# Patient Record
Sex: MALE | Race: WHITE | NOT HISPANIC OR LATINO | Employment: FULL TIME | URBAN - NONMETROPOLITAN AREA
[De-identification: names, ages, dates, MRNs, and addresses within clinical notes are randomized per-mention and may not be internally consistent; named-entity substitution may affect disease eponyms.]

---

## 2017-05-10 ENCOUNTER — OFFICE VISIT - GICH (OUTPATIENT)
Dept: FAMILY MEDICINE | Facility: OTHER | Age: 37
End: 2017-05-10

## 2017-05-10 ENCOUNTER — HISTORY (OUTPATIENT)
Dept: FAMILY MEDICINE | Facility: OTHER | Age: 37
End: 2017-05-10

## 2017-05-10 DIAGNOSIS — J40 BRONCHITIS: ICD-10-CM

## 2017-11-07 ENCOUNTER — HISTORY (OUTPATIENT)
Dept: FAMILY MEDICINE | Facility: OTHER | Age: 37
End: 2017-11-07

## 2017-11-07 ENCOUNTER — OFFICE VISIT - GICH (OUTPATIENT)
Dept: FAMILY MEDICINE | Facility: OTHER | Age: 37
End: 2017-11-07

## 2017-11-07 DIAGNOSIS — H92.01 OTALGIA OF RIGHT EAR: ICD-10-CM

## 2017-11-07 DIAGNOSIS — H69.81 OTHER SPECIFIED DISORDERS OF EUSTACHIAN TUBE, RIGHT EAR: ICD-10-CM

## 2017-12-17 ENCOUNTER — OFFICE VISIT - GICH (OUTPATIENT)
Dept: FAMILY MEDICINE | Facility: OTHER | Age: 37
End: 2017-12-17

## 2017-12-17 ENCOUNTER — HISTORY (OUTPATIENT)
Dept: FAMILY MEDICINE | Facility: OTHER | Age: 37
End: 2017-12-17

## 2017-12-17 DIAGNOSIS — R05.9 COUGH: ICD-10-CM

## 2017-12-27 NOTE — PROGRESS NOTES
Patient Information     Patient Name MRN Sex Alexis Enriquez [id number] Male 1980      Progress Notes by Tosha Preciado NP at 2017  2:45 PM     Author:  Tosha Precaido NP Service:  (none) Author Type:  PHYS- Nurse Practitioner     Filed:  2017  3:10 PM Encounter Date:  2017 Status:  Signed     :  Tosha Preciado NP (PHYS- Nurse Practitioner)            HPI:    Alexis Ibarra is a 37 y.o. male who presents to clinic today for ear concerns. Having sharp pain in right ear for 3-4 days. Denies any cold sx. No fevers. He has taken ibuprofen for ear pain. Denies sore throat or tooth concerns. Pain better with chewing gum. Hx of OM, been a long time since last time.     Past Medical History:     Diagnosis  Date     Asthma      Past Surgical History:      Procedure  Laterality Date     SHOULDER ARTHROSCOPY      Left shoulder arthroscopy and Bank heart repair       Social History     Substance Use Topics       Smoking status: Never Smoker     Smokeless tobacco: Never Used     Alcohol use No     Current Outpatient Prescriptions       Medication  Sig Dispense Refill     albuterol HFA 90 mcg/actuation inhaler Inhale 1-2 Puffs by mouth every 6 hours if needed. 1 Inhaler 0     No current facility-administered medications for this visit.      Medications have been reviewed by me and are current to the best of my knowledge and ability.    Allergies     Allergen  Reactions     Amoxicillin Hives       ROS:  Pertinent positives and negatives are noted in HPI.    EXAM:  General appearance: well appearing male, in no acute distress  Head: normocephalic, atraumatic  Ears: TM's with distorted cone of light and somewhat retracted, no erythema, canals clear bilaterally  Eyes: conjunctivae normal  Orophayrnx: moist mucous membranes, tonsils without erythema, exudates or petechiae, no post nasal drip seen  Neck: supple without adenopathy  Respiratory: clear to auscultation bilaterally  Cardiac: RRR with  no murmurs  Psychological: normal affect, alert and pleasant    ASSESSMENT/PLAN:    ICD-10-CM    1. Otalgia, right ear H92.01    2. Dysfunction of right eustachian tube H69.81    No ear infection noted. Suspect eustachian tube dysfunction. Discussed sx management and s/s that would warrant f/u. All questions were answered and he is in agreement with plan.     Patient Instructions   Warm packs to ear as needed  Tylenol or ibuprofen for ear pain  Chewing gum  May try allergy medication such as Benadryl or Sudafed   Follow up as needed

## 2017-12-28 NOTE — PATIENT INSTRUCTIONS
Patient Information     Patient Name MRAlexis Kiran [id number] Male 1980      Patient Instructions by Tosha Preciado NP at 2017  2:45 PM     Author:  Tosha Preciado NP Service:  (none) Author Type:  PHYS- Nurse Practitioner     Filed:  2017  3:05 PM Encounter Date:  2017 Status:  Signed     :  Tosha Preciado NP (PHYS- Nurse Practitioner)            Warm packs to ear as needed  Tylenol or ibuprofen for ear pain  Chewing gum  May try allergy medication such as Benadryl or Sudafed   Follow up as needed

## 2017-12-30 NOTE — NURSING NOTE
Patient Information     Patient Name MRN Alexis Leigh [id number] Male 1980      Nursing Note by Mable Dubose at 2017  2:45 PM     Author:  Mable Dubose Service:  (none) Author Type:  (none)     Filed:  2017  3:01 PM Encounter Date:  2017 Status:  Signed     :  Mable Dubose            Patient presents to the clinic for right ear pain x3-4 days. Patient reports having sharp pains in his ear and states he has no other symptoms. He has taken ibuprofen with relief.  Mable JAIN, ZOE.......2017..2:56 PM

## 2018-01-05 NOTE — PROGRESS NOTES
"Patient Information     Patient Name MRN Sex Alexis Enriquez [id number] Male 1980      Progress Notes by Estrella Gillette NP at 5/10/2017 12:30 PM     Author:  Estrella Gillette NP Service:  (none) Author Type:  PHYS- Nurse Practitioner     Filed:  5/10/2017  4:13 PM Encounter Date:  5/10/2017 Status:  Signed     :  Estrella Gillette NP (PHYS- Nurse Practitioner)            Nursing Notes:   Kellee Ibarra  5/10/2017 12:58 PM  Signed  Patient presents with fever, chest congestion, body ache, cough productive dark green, vomiting for 3 weeks. Kellee Ibarra LPN .............5/10/2017  12:38 PM    HPI:    Alexis Ibarra is a 36 y.o. male who presents to clinic today for congestion, cough, ear pain, SOB, wheezing at night and joint pain. Symptoms started three weeks ago and have worsened over last week. Denies sore throat, vomiting or sinus pain. Treating symptoms with occasional Mucinex and Tylenol. No flu shot . History of asthma, nonsmoker.     Past Medical History:     Diagnosis  Date     Asthma      Past Surgical History:      Procedure  Laterality Date     SHOULDER ARTHROSCOPY      Left shoulder arthroscopy and Bank heart repair       Social History     Substance Use Topics       Smoking status: Never Smoker     Smokeless tobacco: Never Used     Alcohol use No     No current outpatient prescriptions on file.     No current facility-administered medications for this visit.      Medications have been reviewed by me and are current to the best of my knowledge and ability.    Allergies     Allergen  Reactions     Amoxicillin Hives       ROS:  Refer to HPI    /72  Pulse 81  Temp 99  F (37.2  C) (Tympanic)   Ht 1.71 m (5' 7.32\")  Wt 87.2 kg (192 lb 3.2 oz)  SpO2 97%  BMI 29.81 kg/m2    EXAM:  General Appearance: Somewhat ill appearing male, appropriate appearance for age. No acute distress  Ears: Left TM with bony landmarks appreciated " with cone of light, no erythema, no effusion, no bulging, no purulence.  Right TM with bony landmarks appreciated with cone of light, no erythema, no effusion, no bulging, no purulence.   Left auditory canal clear, Right auditory canal clear, normal external ears, non tender.  Orophayrnx: moist mucous membranes, posterior pharynx, tonsils without hypertrophy, no erythema, no exudates or petechiae, no post nasal drip seen.  No sinus pain upon palpation of the frontal, maxillary, or ethmoid sinuses  Neck: supple without adenopathy  Respiratory: normal chest wall and respirations.  Normal effort.  Clear to auscultation bilaterally, no wheezes or rhonchi or congestion, oxygen saturation-97%  Cardiac: RRR   Psychological: normal affect, alert and pleasant    ASSESSMENT/PLAN:    ICD-10-CM    1. Bronchitis J40 azithromycin (ZITHROMAX) 250 mg tablet      albuterol HFA 90 mcg/actuation inhaler   Cough, congestion, fevers and body aches  On exam: somewhat ill appearing male with low grade fever, lungs clear to auscultation, TMs without erythema, tonsils without erythema  Diagnosis: Bronchitis  Treat with Zithromax 500 mgs today, then 250 mgs days 2-5  Albuterol inhaler every 4-6 hours prn wheezing  Mucinex DM as directed  Encouraged fluids  Tylenol or ibuprofen PRN  Follow up if symptoms persist or worsen    Patient Instructions      Index [place]uran   Acute Bronchitis: Brief Version   What is acute bronchitis?  When you have acute bronchitis, the airways between your windpipe and your lungs are swollen and irritated. It is also called a chest cold.  What is the cause?  Acute bronchitis is most often caused by a virus, like a cold or the flu. Less often, it can be caused by bacteria.  Most of the time, it clears up in several days, but the cough can take longer to go away. It may take you longer to get better if:    You smoke cigarettes.    You have a heart or lung disease or other health problems.    There s a lot of air  pollution or secondhand smoke where you live or work.  What are the symptoms?  You may:    Have a deep cough with yellowish or greenish mucus.    Feel pain in your chest when you breathe deeply or cough.    Wheeze or feel short of breath.    Have a fever or chills.  How can I take care of myself?  Rest at home and drink plenty of fluids to keep the mucus loose. Don t smoke, and stay away from others who are smoking. You should get better in a several days.  If your symptoms are severe or you have heart or lung disease or diabetes, you may need to see your healthcare provider or take medicine.  How can I help prevent acute bronchitis?  You can lower your chances of getting bronchitis if you wash your hands after using the restroom, coughing, sneezing, or blowing your nose. Also wash your hands before eating or touching your eyes.  Developed by [employer].  Adult Advisor 2016.3 published by [employer].  Last modified: 2016-06-29  Last reviewed: 2016-06-08  This content is reviewed periodically and is subject to change as new health information becomes available. The information is intended to inform and educate and is not a replacement for medical evaluation, advice, diagnosis or treatment by a healthcare professional.  References   Adult Advisor 2016.3 Index    Copyright   2016 [employer], a division of [employer] All rights reserved.

## 2018-01-05 NOTE — NURSING NOTE
Patient Information     Patient Name MRN Sex Alexis Enriquez [id number] Male 1980      Nursing Note by Kellee Ibarra at 5/10/2017 12:30 PM     Author:  Kellee Ibarra Service:  (none) Author Type:  NURS- Student Practical Nurse     Filed:  5/10/2017 12:58 PM Encounter Date:  5/10/2017 Status:  Signed     :  Kellee Ibarra (NURS- Student Practical Nurse)            Patient presents with fever, chest congestion, body ache, cough productive dark green, vomiting for 3 weeks. Kellee Ibarra LPN .............5/10/2017  12:38 PM

## 2018-01-05 NOTE — PATIENT INSTRUCTIONS
Patient Information     Patient Name MRN Alexis Leigh [id number] Male 1980      Patient Instructions by Estrella Gillette NP at 5/10/2017 12:30 PM     Author:  Estrella Gillette NP Service:  (none) Author Type:  PHYS- Nurse Practitioner     Filed:  5/10/2017  1:11 PM Encounter Date:  5/10/2017 Status:  Signed     :  Estrella Gillette NP (PHYS- Nurse Practitioner)               Index [place]ani   Acute Bronchitis: Brief Version   What is acute bronchitis?  When you have acute bronchitis, the airways between your windpipe and your lungs are swollen and irritated. It is also called a chest cold.  What is the cause?  Acute bronchitis is most often caused by a virus, like a cold or the flu. Less often, it can be caused by bacteria.  Most of the time, it clears up in several days, but the cough can take longer to go away. It may take you longer to get better if:    You smoke cigarettes.    You have a heart or lung disease or other health problems.    There s a lot of air pollution or secondhand smoke where you live or work.  What are the symptoms?  You may:    Have a deep cough with yellowish or greenish mucus.    Feel pain in your chest when you breathe deeply or cough.    Wheeze or feel short of breath.    Have a fever or chills.  How can I take care of myself?  Rest at home and drink plenty of fluids to keep the mucus loose. Don t smoke, and stay away from others who are smoking. You should get better in a several days.  If your symptoms are severe or you have heart or lung disease or diabetes, you may need to see your healthcare provider or take medicine.  How can I help prevent acute bronchitis?  You can lower your chances of getting bronchitis if you wash your hands after using the restroom, coughing, sneezing, or blowing your nose. Also wash your hands before eating or touching your eyes.  Developed by [facility].  Adult Advisor 2016.3 published by  [employer].  Last modified: 2016-06-29  Last reviewed: 2016-06-08  This content is reviewed periodically and is subject to change as new health information becomes available. The information is intended to inform and educate and is not a replacement for medical evaluation, advice, diagnosis or treatment by a healthcare professional.  References   Adult Advisor 2016.3 Index    Copyright   2016 [employer], a division of [employer] All rights reserved.

## 2018-01-27 VITALS
BODY MASS INDEX: 31.16 KG/M2 | WEIGHT: 198.5 LBS | HEIGHT: 67 IN | SYSTOLIC BLOOD PRESSURE: 128 MMHG | DIASTOLIC BLOOD PRESSURE: 72 MMHG | HEART RATE: 76 BPM | TEMPERATURE: 97.2 F

## 2018-01-27 VITALS
SYSTOLIC BLOOD PRESSURE: 138 MMHG | HEART RATE: 81 BPM | WEIGHT: 192.2 LBS | TEMPERATURE: 99 F | BODY MASS INDEX: 30.17 KG/M2 | HEIGHT: 67 IN | DIASTOLIC BLOOD PRESSURE: 72 MMHG | OXYGEN SATURATION: 97 %

## 2018-01-31 ENCOUNTER — DOCUMENTATION ONLY (OUTPATIENT)
Dept: FAMILY MEDICINE | Facility: OTHER | Age: 38
End: 2018-01-31

## 2018-01-31 PROBLEM — J45.909 ASTHMA: Status: ACTIVE | Noted: 2018-01-31

## 2018-01-31 PROBLEM — S43.006A CLOSED DISLOCATION OF SHOULDER: Status: ACTIVE | Noted: 2018-01-31

## 2018-01-31 RX ORDER — CODEINE PHOSPHATE/GUAIFENESIN 10-100MG/5
10 LIQUID (ML) ORAL
COMMUNITY
Start: 2017-12-17 | End: 2019-11-02

## 2018-01-31 RX ORDER — BENZONATATE 100 MG/1
1 CAPSULE ORAL 3 TIMES DAILY PRN
COMMUNITY
Start: 2017-12-17 | End: 2019-11-02

## 2018-01-31 RX ORDER — ALBUTEROL SULFATE 90 UG/1
1-2 AEROSOL, METERED RESPIRATORY (INHALATION) EVERY 6 HOURS PRN
COMMUNITY
Start: 2017-05-10 | End: 2019-11-02

## 2018-02-09 VITALS
WEIGHT: 193 LBS | TEMPERATURE: 98.3 F | DIASTOLIC BLOOD PRESSURE: 80 MMHG | SYSTOLIC BLOOD PRESSURE: 138 MMHG | HEART RATE: 72 BPM | BODY MASS INDEX: 30.23 KG/M2

## 2018-02-12 NOTE — PROGRESS NOTES
Patient Information     Patient Name MRN Sex Alexis Enriquez [id number] Male 1980      Progress Notes by Estrella Gillette NP at 2017 12:30 PM     Author:  Estrella Gillette NP Service:  (none) Author Type:  PHYS- Nurse Practitioner     Filed:  2017  3:48 PM Encounter Date:  2017 Status:  Signed     :  Estrella Gillette NP (PHYS- Nurse Practitioner)            Nursing Notes:   Adrián Preciado  2017 12:14 PM  Signed  Patient presents to the [facility] with concerns of a sore throat, cough and right ear pain. Patient states that this has all been going for the last 3-4 days. Patient does state that his cough has been bothering him for over a week. Patient denies any use of medication today.    Adrián Preciado ....................  2017   12:10 PM      Alexis Ibarra is a 37 y.o. male who presents to clinic today for pain in right ear, chest feels heavy and pain behind eyes and really bad cough that started 3-4 days ago. Denies fever, congestion or diarrhea. Treating symptoms with Mucinex which hasn't been helpful. Eating and drinking without difficulty.     Past Medical History:     Diagnosis  Date     Asthma      Past Surgical History:      Procedure  Laterality Date     SHOULDER ARTHROSCOPY      Left shoulder arthroscopy and Bank heart repair       Social History     Substance Use Topics       Smoking status: Never Smoker     Smokeless tobacco: Never Used     Alcohol use No     Current Outpatient Prescriptions       Medication  Sig Dispense Refill     albuterol HFA 90 mcg/actuation inhaler Inhale 1-2 Puffs by mouth every 6 hours if needed. 1 Inhaler 0     No current facility-administered medications for this visit.      Medications have been reviewed by me and are current to the best of my knowledge and ability.    Allergies     Allergen  Reactions     Amoxicillin Hives       ROS:  Refer to HPI    /80 (Cuff Site: Right Arm,  Position: Sitting, Cuff Size: Adult Regular)  Pulse 72  Temp 98.3  F (36.8  C) (Tympanic)   Wt 87.5 kg (193 lb)  BMI 30.23 kg/m2    EXAM:  General Appearance: Well appearing male, appropriate appearance for age. No acute distress  Ears: Left TM with bony landmarks appreciated with cone of light, no erythema, no effusion, no bulging, no purulence.  Right TM with bony landmarks appreciated with cone of light, no erythema, no effusion, no bulging, no purulence.   Left auditory canal clear, Right auditory canal clear, normal external ears, non tender.  Orophayrnx: moist mucous membranes, posterior pharynx, tonsils without hypertrophy, no erythema  Neck: supple without adenopathy  Respiratory: normal chest wall and respirations.  Normal effort.  Clear to auscultation bilaterally  Cardiac: RRR   Psychological: normal affect, alert and pleasant    ASSESSMENT/PLAN:    ICD-10-CM    1. Cough R05 benzonatate (TESSALON) 100 mg capsule      codeine-guaiFENesin (ROBITUSSIN AC)  mg/5 mL liquid   Cough, right ear pain, chest feels heavy  On exam: well appearing male without fever, lungs clear to auscultation, TMs without erythema, tonsils without erythema  Diagnosis: Cough  Treat with increased fluids and rest  Tessalon 100 mgs PO TID prn during the daytime and Cheratussin cough syrup 10 mls prn at HS  Follow up if symptoms persist or worsen    There are no Patient Instructions on file for this visit.

## 2018-02-12 NOTE — NURSING NOTE
Patient Information     Patient Name MRN Alexis Leigh [id number] Male 1980      Nursing Note by Adrián Preciado at 2017 12:30 PM     Author:  Adrián Preciado Service:  (none) Author Type:  (none)     Filed:  2017 12:14 PM Encounter Date:  2017 Status:  Signed     :  Adrián Preciado            Patient presents to the [facility] with concerns of a sore throat, cough and right ear pain. Patient states that this has all been going for the last 3-4 days. Patient does state that his cough has been bothering him for over a week. Patient denies any use of medication today.    Adrián Preciado ....................  2017   12:10 PM

## 2018-07-23 NOTE — PROGRESS NOTES
Patient Information     Patient Name  Alexis Ibarra MRN  [id number] Sex  Male   1980      Letter by Estrella Gillette NP at      Author:  Estrella Gillette NP Service:  (none) Author Type:  (none)    Filed:   Encounter Date:  5/10/2017 Status:  (Other)             Work/School Excuse and Restrictions       Arrived:               Discharged:                                                              1:12 PM  5/10/2017        Alexis Ibarra  was seen today in the [facility], please excuse from work today May 10 and May 11.      Sincerely-    Sarah MELTON

## 2019-11-02 ENCOUNTER — OFFICE VISIT (OUTPATIENT)
Dept: FAMILY MEDICINE | Facility: OTHER | Age: 39
End: 2019-11-02
Attending: NURSE PRACTITIONER
Payer: COMMERCIAL

## 2019-11-02 VITALS
HEIGHT: 67 IN | TEMPERATURE: 98.9 F | SYSTOLIC BLOOD PRESSURE: 122 MMHG | WEIGHT: 207.9 LBS | HEART RATE: 84 BPM | RESPIRATION RATE: 20 BRPM | BODY MASS INDEX: 32.63 KG/M2 | OXYGEN SATURATION: 97 % | DIASTOLIC BLOOD PRESSURE: 82 MMHG

## 2019-11-02 DIAGNOSIS — J06.9 URI WITH COUGH AND CONGESTION: Primary | ICD-10-CM

## 2019-11-02 DIAGNOSIS — J45.909 UNCOMPLICATED ASTHMA, UNSPECIFIED ASTHMA SEVERITY, UNSPECIFIED WHETHER PERSISTENT: ICD-10-CM

## 2019-11-02 PROCEDURE — G0463 HOSPITAL OUTPT CLINIC VISIT: HCPCS

## 2019-11-02 PROCEDURE — 99213 OFFICE O/P EST LOW 20 MIN: CPT | Performed by: FAMILY MEDICINE

## 2019-11-02 RX ORDER — PSEUDOEPHEDRINE HCL 120 MG/1
120 TABLET, FILM COATED, EXTENDED RELEASE ORAL EVERY 12 HOURS
Qty: 20 TABLET | Refills: 0 | Status: SHIPPED | OUTPATIENT
Start: 2019-11-02 | End: 2019-12-12

## 2019-11-02 RX ORDER — ALBUTEROL SULFATE 90 UG/1
1-2 AEROSOL, METERED RESPIRATORY (INHALATION) EVERY 4 HOURS PRN
Qty: 1 INHALER | Refills: 5 | Status: SHIPPED | OUTPATIENT
Start: 2019-11-02

## 2019-11-02 RX ORDER — BENZONATATE 200 MG/1
200 CAPSULE ORAL 3 TIMES DAILY PRN
Qty: 30 CAPSULE | Refills: 0 | Status: SHIPPED | OUTPATIENT
Start: 2019-11-02 | End: 2019-12-12

## 2019-11-02 ASSESSMENT — MIFFLIN-ST. JEOR: SCORE: 1821.66

## 2019-11-02 NOTE — PROGRESS NOTES
Nursing Notes:   Mable Toure CMA  11/2/2019 11:53 AM  Signed  Patient presents to the clinic for bilateral ear pain x 1 day and chest congestion, cough x 2-3 days. He has taken Tylenol for treatment.  Medication Reconciliation: complete  Mable Toure CMA      SUBJECTIVE:   Alexis Ibarra is a 39 year old male who presents to clinic today for the following health issues:    HPI   Alexis is here for cold symptoms that started ~3 days ago.  Last night - was most bothered by ears; keeping him up at night.  Also up with coughing.  + Sore throat, Wheezing.  Tried sitting in the shower/humidity without significant relief.  Tried tylenol - brought temperature down; also tried Children's mucinex without much relief.  Has a history of asthma and needing his inhaler. Triggers for his asthma are illness.  No known sick contacts.    Patient Active Problem List    Diagnosis Date Noted     Asthma 01/31/2018     Priority: Medium     Closed dislocation of shoulder 01/31/2018     Priority: Medium     Overview:   left, recurrent       Pain in joint 08/30/2010     Priority: Medium     Past Medical History:   Diagnosis Date     Uncomplicated asthma     No Comments Provided      Past Surgical History:   Procedure Laterality Date     ARTHROSCOPY SHOULDER      9/02,Left shoulder arthroscopy and Bank heart repair     History reviewed. No pertinent family history.  Social History     Tobacco Use     Smoking status: Never Smoker     Smokeless tobacco: Never Used   Substance Use Topics     Alcohol use: No     Social History     Patient does not qualify to have social determinant information on file (likely too young).   Social History Narrative    Works at  [employer] 8/29/2013     Current Outpatient Medications   Medication Sig Dispense Refill     albuterol (PROAIR HFA/PROVENTIL HFA/VENTOLIN HFA) 108 (90 Base) MCG/ACT inhaler Inhale 1-2 puffs into the lungs every 4 hours as needed for shortness of breath / dyspnea or wheezing 1  "Inhaler 5     Allergies   Allergen Reactions     Amoxicillin Hives     Review of Systems   All other systems reviewed and are negative.     OBJECTIVE:     /82 (BP Location: Right arm)   Pulse 84   Temp 98.9  F (37.2  C) (Tympanic)   Resp 20   Ht 1.702 m (5' 7\")   Wt 94.3 kg (207 lb 14.4 oz)   SpO2 97%   BMI 32.56 kg/m    Body mass index is 32.56 kg/m .  Physical Exam  Vitals signs and nursing note reviewed.   Constitutional:       Appearance: Normal appearance.   HENT:      Head: Normocephalic and atraumatic.      Nose: Nose normal.      Mouth/Throat:      Mouth: Mucous membranes are moist.   Eyes:      Pupils: Pupils are equal, round, and reactive to light.   Neck:      Musculoskeletal: Normal range of motion.   Cardiovascular:      Rate and Rhythm: Normal rate and regular rhythm.   Pulmonary:      Effort: Pulmonary effort is normal.      Breath sounds: Normal breath sounds.   Skin:     Capillary Refill: Capillary refill takes less than 2 seconds.   Neurological:      Mental Status: He is alert.   Psychiatric:         Mood and Affect: Mood normal.     Diagnostic Test Results:  None    ASSESSMENT/PLAN:     1. Uncomplicated asthma, unspecified asthma severity, unspecified whether persistent  Triggered by acute URI; contributing to cough/equilibrium change of ears.  Will help treat cough with regular use of albuterol for the next 3-5 days; every 4 hours prn.  No significant wheezing on exam; therefore will not add abx or steroids at this time.  - albuterol (PROAIR HFA/PROVENTIL HFA/VENTOLIN HFA) 108 (90 Base) MCG/ACT inhaler; Inhale 1-2 puffs into the lungs every 4 hours as needed for shortness of breath / dyspnea or wheezing  Dispense: 1 Inhaler; Refill: 5    2. URI with cough and congestion  OK for decongestant and benzonatate to help decrease cough; use in conjunction with albuterol.  RTC if not improving in 5-7 days; or sooner prn.  - benzonatate (TESSALON) 200 MG capsule; Take 1 capsule (200 mg) by " mouth 3 times daily as needed for cough  Dispense: 30 capsule; Refill: 0  - pseudoePHEDrine (SUDAFED) 120 MG 12 hr tablet; Take 1 tablet (120 mg) by mouth every 12 hours for 10 days  Dispense: 20 tablet; Refill: 0      Bonnie Hernandez [facility]

## 2019-11-02 NOTE — NURSING NOTE
Patient presents to the clinic for bilateral ear pain x 1 day and chest congestion, cough x 2-3 days. He has taken Tylenol for treatment.  Medication Reconciliation: complete    Mable Toure, CMA

## 2019-12-12 ENCOUNTER — HOSPITAL ENCOUNTER (OUTPATIENT)
Dept: GENERAL RADIOLOGY | Facility: OTHER | Age: 39
Discharge: HOME | End: 2019-12-12
Attending: NURSE PRACTITIONER | Admitting: NURSE PRACTITIONER
Payer: OTHER MISCELLANEOUS

## 2019-12-12 ENCOUNTER — OFFICE VISIT (OUTPATIENT)
Dept: FAMILY MEDICINE | Facility: OTHER | Age: 39
End: 2019-12-12
Attending: PHYSICIAN ASSISTANT
Payer: OTHER MISCELLANEOUS

## 2019-12-12 VITALS
TEMPERATURE: 98.7 F | HEART RATE: 80 BPM | HEIGHT: 67 IN | BODY MASS INDEX: 33.9 KG/M2 | SYSTOLIC BLOOD PRESSURE: 138 MMHG | DIASTOLIC BLOOD PRESSURE: 70 MMHG | WEIGHT: 216 LBS | OXYGEN SATURATION: 97 % | RESPIRATION RATE: 20 BRPM

## 2019-12-12 DIAGNOSIS — S93.402A SPRAIN OF LEFT ANKLE, UNSPECIFIED LIGAMENT, INITIAL ENCOUNTER: ICD-10-CM

## 2019-12-12 DIAGNOSIS — S99.912A ANKLE INJURY, LEFT, INITIAL ENCOUNTER: Primary | ICD-10-CM

## 2019-12-12 DIAGNOSIS — S99.912A ANKLE INJURY, LEFT, INITIAL ENCOUNTER: ICD-10-CM

## 2019-12-12 PROCEDURE — 73610 X-RAY EXAM OF ANKLE: CPT | Mod: LT

## 2019-12-12 PROCEDURE — 99213 OFFICE O/P EST LOW 20 MIN: CPT | Performed by: NURSE PRACTITIONER

## 2019-12-12 ASSESSMENT — MIFFLIN-ST. JEOR: SCORE: 1858.4

## 2019-12-12 ASSESSMENT — PAIN SCALES - GENERAL: PAINLEVEL: MODERATE PAIN (5)

## 2019-12-12 NOTE — NURSING NOTE
Patient in clinic for L ankle pain after rolling it and landed on ankle while changing a truck tire.      Lucía Torres LPN LPN....................  12/12/2019   5:51 PM    Chief Complaint   Patient presents with     Musculoskeletal Problem       Medication Reconciliation: complete    Lucía Torres LPN

## 2019-12-13 NOTE — PROGRESS NOTES
"Subjective     Alexis Ibarra is a 39 year old male who presents to clinic today for the following health issues:    HPI   Musculoskeletal problem/pain      Duration: a few hours    Description  Location: L ankle    Intensity:  Moderate when sitting, better with movement though more painful when walking    Accompanying signs and symptoms: numbness and tingling    History  Previous similar problem: no   Previous evaluation:  none    Precipitating or alleviating factors:  Trauma or overuse: YES- rolled his ankle this afternoon when changing a tire on the work vehicle  Aggravating factors include: walking    Therapies tried and outcome: nothing    Reviewed and updated as needed this visit by Provider  Tobacco  Allergies  Meds  Problems  Med Hx  Surg Hx  Fam Hx  Soc Hx          Review of Systems   ROS COMP: As above      Objective    /70 (BP Location: Left arm, Patient Position: Sitting, Cuff Size: Adult Regular)   Pulse 80   Temp 98.7  F (37.1  C) (Tympanic)   Resp 20   Ht 1.702 m (5' 7\")   Wt 98 kg (216 lb)   SpO2 97%   BMI 33.83 kg/m    Body mass index is 33.83 kg/m .  Physical Exam   GENERAL: healthy, alert and no distress  MS: LLE exam shows normal strength and muscle mass, no deformities, no erythema, induration, or nodules, no evidence of joint effusion, ROM of all joints is normal, no evidence of joint instability and mild edema present on the lateral aspect of talus with mild tenderness to palpation over same  SKIN: no suspicious lesions or rashes  NEURO: Normal strength and tone, mentation intact and speech normal  PSYCH: mentation appears normal, affect normal/bright    Diagnostic Test Results:  Labs reviewed in Epic    PROCEDURE:  XR ANKLE LT G/E 3 VW     HISTORY: Ankle injury, left, initial encounter     COMPARISON:  None.     TECHNIQUE:  3 views of the left ankle were obtained.     FINDINGS:  No fracture or dislocation is identified. The joint spaces  are preserved.                    "                                                     IMPRESSION: No acute fracture.       THONY CHEUNG MD        Assessment & Plan       ICD-10-CM    1. Ankle injury, left, initial encounter S99.912A XR Ankle Left G/E 3 Views   2. Sprain of left ankle, unspecified ligament, initial encounter S93.402A    Was at work today when a blown tire required changing. In the midst of this, Alexis rolled his ankle to the medial side and felt pain on the lateral aspect of the talus and just in front of the lateral malleolus. Xrays as above, ankle wrapped with ACE. Discussed RICE, off work tomorrow and anticipate full return Monday. Has follow up appt with Dr Cobos for WC evaluation. Tylenol or motrin as needed for pain.       Joan Gordillo NP  [facility]

## 2019-12-17 ENCOUNTER — OFFICE VISIT (OUTPATIENT)
Dept: FAMILY MEDICINE | Facility: OTHER | Age: 39
End: 2019-12-17
Attending: CHIROPRACTOR
Payer: OTHER MISCELLANEOUS

## 2019-12-17 VITALS
DIASTOLIC BLOOD PRESSURE: 86 MMHG | SYSTOLIC BLOOD PRESSURE: 138 MMHG | HEART RATE: 80 BPM | TEMPERATURE: 97.1 F | RESPIRATION RATE: 20 BRPM | OXYGEN SATURATION: 97 % | WEIGHT: 216 LBS | BODY MASS INDEX: 33.83 KG/M2

## 2019-12-17 DIAGNOSIS — S99.912A ANKLE INJURY, LEFT, INITIAL ENCOUNTER: Primary | ICD-10-CM

## 2019-12-17 DIAGNOSIS — S93.402A SPRAIN OF LEFT ANKLE, UNSPECIFIED LIGAMENT, INITIAL ENCOUNTER: ICD-10-CM

## 2019-12-17 PROCEDURE — 99213 OFFICE O/P EST LOW 20 MIN: CPT | Performed by: CHIROPRACTOR

## 2019-12-17 ASSESSMENT — PAIN SCALES - GENERAL: PAINLEVEL: MILD PAIN (2)

## 2019-12-17 NOTE — NURSING NOTE
Alexis Ibarra is a 39 year old male presenting for a work comp follow up from the [facility] for: left ankle injury  DATE OF INJURY: 12/12/19    Medication Reconciliation: complete     Review Of Systems  Skin: negative  Eyes: negative  Ears/Nose/Throat: negative  Respiratory: No shortness of breath, dyspnea on exertion, cough, or hemoptysis  Cardiovascular: negative  Gastrointestinal: negative  Genitourinary: negative  Musculoskeletal: positive for injury to left ankle/foot  Neurologic: negative  Psychiatric: negative  Hematologic/Lymphatic/Immunologic: negative  Endocrine: negative      Celeste Simmons LPN  12/17/2019 2:38 PM

## 2019-12-17 NOTE — PROGRESS NOTES
Chief Complaint   Patient presents with     Work Comp     left ankle       HISTORY OF PRESENTING INJURY     Alexis presents today for follow up of a left ankle sprain occurring at the workplace.  He works for [employer] and was attempting to change a flat tire on 12/12/2019.  While crouched down, his left ankle rolled inward.  He did not feel a pop associated with this but did have a lot of pain.  [facility] were performed, and negative.          Oswestry (CHRISTINA) Questionnaire    No flowsheet data found.         PAST MEDICAL HISTORY:  Past Medical History:   Diagnosis Date     Uncomplicated asthma     No Comments Provided       PAST SURGICAL HISTORY:  Past Surgical History:   Procedure Laterality Date     ARTHROSCOPY SHOULDER      9/02,Left shoulder arthroscopy and Bank heart repair       ALLERGIES:  Allergies   Allergen Reactions     Amoxicillin Hives       CURRENT MEDICATIONS:  Current Outpatient Medications   Medication Sig Dispense Refill     albuterol (PROAIR HFA/PROVENTIL HFA/VENTOLIN HFA) 108 (90 Base) MCG/ACT inhaler Inhale 1-2 puffs into the lungs every 4 hours as needed for shortness of breath / dyspnea or wheezing 1 Inhaler 5       SOCIAL HISTORY:  Social History     Socioeconomic History     Marital status:      Spouse name: Not on file     Number of children: Not on file     Years of education: Not on file     Highest education level: Not on file   Occupational History     Not on file   Social Needs     Financial resource strain: Not on file     Food insecurity:     Worry: Not on file     Inability: Not on file     Transportation needs:     Medical: Not on file     Non-medical: Not on file   Tobacco Use     Smoking status: Never Smoker     Smokeless tobacco: Never Used   Substance and Sexual Activity     Alcohol use: No     Drug use: Never     Types: Other     Sexual activity: Yes   Lifestyle     Physical activity:     Days per week: Not on file     Minutes per  session: Not on file     Stress: Not on file   Relationships     Social connections:     Talks on phone: Not on file     Gets together: Not on file     Attends Sikh service: Not on file     Active member of club or organization: Not on file     Attends meetings of clubs or organizations: Not on file     Relationship status: Not on file     Intimate partner violence:     Fear of current or ex partner: Not on file     Emotionally abused: Not on file     Physically abused: Not on file     Forced sexual activity: Not on file   Other Topics Concern     Not on file   Social History Narrative    Works at  [employer] 8/29/2013       FAMILY HISTORY:  History reviewed. No pertinent family history.    REVIEW OF SYSTEMS:    Nursing Notes:   Celeste Simmons LPN  12/17/2019  2:58 PM  Signed  Alexis Ibarra is a 39 year old male presenting for a work comp follow up from the [facility] for: left ankle injury  DATE OF INJURY: 12/12/19    Medication Reconciliation: complete     Review Of Systems  Skin: negative  Eyes: negative  Ears/Nose/Throat: negative  Respiratory: No shortness of breath, dyspnea on exertion, cough, or hemoptysis  Cardiovascular: negative  Gastrointestinal: negative  Genitourinary: negative  Musculoskeletal: positive for injury to left ankle/foot  Neurologic: negative  Psychiatric: negative  Hematologic/Lymphatic/Immunologic: negative  Endocrine: negative      Celeste Simmons LPN  12/17/2019 2:38 PM     Reviewed        PHYSICAL EXAM:   /86   Pulse 80   Temp 97.1  F (36.2  C) (Tympanic)   Resp 20   Wt 98 kg (216 lb)   SpO2 97%   BMI 33.83 kg/m   Body mass index is 33.83 kg/m . General Appearance: No acute distress.    Reviewed and discussed his recent ankle x-rays with him.  These are non-weight bearing and negative for fx.    He has essentially normal ankle motion in all planes.  I do not find any edema or signs of bruising.  Palpation of the cuboid is painful.  Heel compression  is negative for pain.  He is able to wiggle all toes.  Gait shows normal weight bearing and toe-off.  He does not favor the left ankle.      IMPRESSION/PLAN:    Patient sustained a grade 1 sprain of his left ankle which has mostly healed to this point.  He may need a couple more weeks to have full resolution.  Therefore, we will see him if worsening occurs, and close his case on 1/15/2020 should we not hear of any ongoing complaints.  Encouraged him to wear elastic support if needed.  He chose to purchase this on his own.    Greater than 50% of this 21 minute encounter was spent in counseling and coordination of care regarding the above condition.        Dannie Cobos DC, PAIGE  Director - Occupational Medicine Department  [address] [address]  Phone [phone]  Fax [phone]    Disclaimer:  This note consists of words and symbols derived from keyboarding, dictation, or using voice recognition software. As a result, there may be errors in the script that have gone undetected. Please consider this when interpreting information found in this note.    4:36 PM 12/17/2019

## 2020-08-03 ENCOUNTER — ALLIED HEALTH/NURSE VISIT (OUTPATIENT)
Dept: FAMILY MEDICINE | Facility: OTHER | Age: 40
End: 2020-08-03
Attending: PHYSICIAN ASSISTANT
Payer: COMMERCIAL

## 2020-08-03 DIAGNOSIS — Z20.822 COVID-19 RULED OUT: Primary | ICD-10-CM

## 2020-08-03 DIAGNOSIS — Z20.822 COVID-19 RULED OUT: ICD-10-CM

## 2020-08-03 DIAGNOSIS — Z20.822 EXPOSURE TO COVID-19 VIRUS: Primary | ICD-10-CM

## 2020-08-03 PROCEDURE — C9803 HOPD COVID-19 SPEC COLLECT: HCPCS

## 2020-08-03 PROCEDURE — 99441 ZZC PHYSICIAN TELEPHONE EVALUATION 5-10 MIN: CPT | Performed by: PHYSICIAN ASSISTANT

## 2020-08-03 PROCEDURE — U0003 INFECTIOUS AGENT DETECTION BY NUCLEIC ACID (DNA OR RNA); SEVERE ACUTE RESPIRATORY SYNDROME CORONAVIRUS 2 (SARS-COV-2) (CORONAVIRUS DISEASE [COVID-19]), AMPLIFIED PROBE TECHNIQUE, MAKING USE OF HIGH THROUGHPUT TECHNOLOGIES AS DESCRIBED BY CMS-2020-01-R: HCPCS | Mod: ZL | Performed by: PHYSICIAN ASSISTANT

## 2020-08-03 PROCEDURE — 99207 ZZC NO CHARGE NURSE ONLY: CPT

## 2020-08-03 NOTE — NURSING NOTE
Patient was exposed by foster child.  Aleah Marcos LPN ....................  8/3/2020   8:24 AM

## 2020-08-03 NOTE — PATIENT INSTRUCTIONS
"Discharge Instructions for COVID-19 Patients  You have--or may have--COVID-19. Please follow the instructions listed below.   If you have a weakened immune system, discuss with your doctor any other actions you need to take.  How can I protect others?  If you have symptoms (fever, cough, body aches or trouble breathing):    Stay home and away from others (self-isolate) until:  ? At least 10 days have passed since your symptoms started. And   ? You've had no fever--and no medicine that reduces fever--for 3 full days (72 hours). And   ? Your other symptoms have resolved (gotten better).  If you don't show symptoms, but testing showed that you have COVID-19:    Stay home and away from others (self-isolate) until at least 10 days have passed since the date of your first positive COVID-19 test.  During this time    Stay in your own room, even for meals. Use your own bathroom if you can.    Stay away from others in your home. No hugging, kissing or shaking hands. No visitors.    Don't go to work, school or anywhere else.    Clean \"high touch\" surfaces often (doorknobs, counters, handles). Use household cleaning spray or wipes. You'll find a full list of  on the EPA website: www.epa.gov/pesticide-registration/list-n-disinfectants-use-against-sars-cov-2.    Cover your mouth and nose with a mask, tissue or wash cloth to avoid spreading germs.    Wash your hands and face often. Use soap and water.    Caregivers in these groups are at risk for severe illness due to COVID-19:  ? People 65 years and older  ? People who live in a nursing home or long-term care facility  ? People with chronic disease (lung, heart, cancer, diabetes, kidney, liver, immunologic)  ? People who have a weakened immune system, including those who:    Are in cancer treatment    Take medicine that weakens the immune system, such as corticosteroids    Had a bone marrow or organ transplant    Have an immune deficiency    Have poorly controlled HIV or " AIDS    Are obese (body mass index of 40 or higher)    Smoke regularly    Caregivers should wear gloves while washing dishes, handling laundry and cleaning bedrooms and bathrooms.    Use caution when washing and drying laundry: Don't shake dirty laundry and use the warmest water setting that you can.    For more tips on managing your health at home, go to www.cdc.gov/coronavirus/2019-ncov/downloads/10Things.pdf.  How can I take care of myself at home?  1. Get lots of rest. Drink extra fluids (unless a doctor has told you not to).  2. Take Tylenol (acetaminophen) for fever or pain. If you have liver or kidney problems, ask your family doctor if it's okay to take Tylenol.     Adults can take either:  ? 650 mg (two 325 mg pills) every 4 to 6 hours, or   ? 1,000 mg (two 500 mg pills) every 8 hours as needed.  ? Note: Don't take more than 3,000 mg in one day. Acetaminophen is found in many medicines (both prescribed and over-the-counter medicines). Read all labels to be sure you don't take too much.   For children, check the Tylenol bottle for the right dose. The dose is based on the child's age or weight.  3. If you have other health problems (like cancer, heart failure, an organ transplant or severe kidney disease): Call your specialty clinic if you don't feel better in the next 2 days.  4. Know when to call 911. Emergency warning signs include:  ? Trouble breathing or shortness of breath  ? Pain or pressure in the chest that doesn't go away  ? Feeling confused like you haven't felt before, or not being able to wake up  ? Bluish-colored lips or face  5. Your doctor may have prescribed a blood thinner medicine. Follow their instructions.  Where can I get more information?     [facility] - About COVID-19:   [email or web address]."[email or web address]    CDC - What to Do If You're Sick: www.cdc.gov/coronavirus/2019-ncov/about/steps-when-sick.html    CDC - Ending Home Isolation:  www.cdc.gov/coronavirus/2019-ncov/hcp/disposition-in-home-patients.html    CDC - Caring for Someone: www.cdc.gov/coronavirus/2019-ncov/if-you-are-sick/care-for-someone.html    [facility] - Interim Guidance for Hospital Discharge to Home: www.[facility].[place]/diseases/coronavirus/hcp/hospdischarge.pdf    [facility] clinical trials (COVID-19 research studies): clinicalaffairs.[facility]-clinical-trials    Below are the COVID-19 hotlines at the [place] Department of Health ([facility]). Interpreters are available.  ? For health questions: Call 702-769-8232 or 1-258.202.9341 (7 a.m. to 7 p.m.)  ? For questions about schools and childcare: Call 807-656-6782 or 1-167.311.1044 (7 a.m. to 7 p.m.)    For informational purposes only. Not to replace the advice of your health care provider. Clinically reviewed by the Infection Prevention Team.Copyright   2020 [facility]. All rights reserved. Potential [id number] - 06/20.

## 2020-08-03 NOTE — PROGRESS NOTES
"Alexis Ibarra is a 40 year old male who is being evaluated via a billable telephone visit.      The patient has been notified of following:     \"This telephone visit will be conducted via a call between you and your physician/provider. We have found that certain health care needs can be provided without the need for a physical exam.  This service lets us provide the care you need with a short phone conversation.  If a prescription is necessary we can send it directly to your pharmacy.  If lab work is needed we can place an order for that and you can then stop by our lab to have the test done at a later time.    Telephone visits are billed at different rates depending on your insurance coverage. During this emergency period, for some insurers they may be billed the same as an in-person visit.  Please reach out to your insurance provider with any questions.    If during the course of the call the physician/provider feels a telephone visit is not appropriate, you will not be charged for this service.\"    Patient has given verbal consent for Telephone visit?  Yes    What phone number would you like to be contacted at? [phone]    How would you like to obtain your AVS? Mail a copy    Subjective     Alexis Ibarra is a 40 year old male who presents via phone visit today for the following health issues:    HPI  Patient is contacted via telephone for consideration of COVID-19 testing. Patient states that they were recently exposed to a COVID positive individual. That positive contact is their foster child who lives in their home who tested positive last Thursday. Patient is currently asymptomatic. Denies fever, chills, sweats, cough, shortness of breath, wheezing, muscle or body aches, headaches, GI symptoms, rash. Patient does have a history of asthma that has been well controlled with albuterol inhaler. Non smoker.     PAST MEDICAL HISTORY:   Past Medical History:   Diagnosis Date     Uncomplicated asthma     No " Comments Provided       PAST SURGICAL HISTORY:   Past Surgical History:   Procedure Laterality Date     ARTHROSCOPY SHOULDER      9/02,Left shoulder arthroscopy and Bank heart repair       FAMILY HISTORY: No family history on file.    SOCIAL HISTORY:   Social History     Tobacco Use     Smoking status: Never Smoker     Smokeless tobacco: Never Used   Substance Use Topics     Alcohol use: No        Allergies   Allergen Reactions     Amoxicillin Hives     Current Outpatient Medications   Medication     albuterol (PROAIR HFA/PROVENTIL HFA/VENTOLIN HFA) 108 (90 Base) MCG/ACT inhaler     No current facility-administered medications for this visit.          Reviewed and updated as needed this visit by Provider         Review of Systems   Constitutional, HEENT, cardiovascular, pulmonary, gi and gu systems are negative, except as otherwise noted.       Objective   Reported vitals:  There were no vitals taken for this visit.   healthy, alert and no distress  PSYCH: Alert and oriented times 3; coherent speech, normal   rate and volume, able to articulate logical thoughts, able   to abstract reason, no tangential thoughts, no hallucinations   or delusions  His affect is normal  RESP: No cough, no audible wheezing, able to talk in full sentences  Remainder of exam unable to be completed due to telephone visits          Assessment/Plan:  1. Exposure to COVID-19 virus    2. COVID-19 ruled out      Patient meets criteria for COVID-19 testing given prolonged exposure to known COVID positive individual. They are informed to self quarantine until results are available. They have been provided information to complete curbside testing. Will notify with results. If positive, patient is to self-quarantine at home for 14 days.       Phone call duration:  6 minutes  Mariely Singh PA-C

## 2020-08-04 LAB
SARS-COV-2 RNA SPEC QL NAA+PROBE: NOT DETECTED
SPECIMEN SOURCE: NORMAL

## 2021-01-03 ENCOUNTER — HEALTH MAINTENANCE LETTER (OUTPATIENT)
Age: 41
End: 2021-01-03

## 2021-10-09 ENCOUNTER — HEALTH MAINTENANCE LETTER (OUTPATIENT)
Age: 41
End: 2021-10-09

## 2022-01-29 ENCOUNTER — HEALTH MAINTENANCE LETTER (OUTPATIENT)
Age: 42
End: 2022-01-29

## 2022-09-11 ENCOUNTER — OFFICE VISIT (OUTPATIENT)
Dept: FAMILY MEDICINE | Facility: OTHER | Age: 42
End: 2022-09-11
Attending: FAMILY MEDICINE
Payer: COMMERCIAL

## 2022-09-11 VITALS
OXYGEN SATURATION: 98 % | WEIGHT: 203 LBS | BODY MASS INDEX: 31.79 KG/M2 | DIASTOLIC BLOOD PRESSURE: 80 MMHG | HEART RATE: 66 BPM | TEMPERATURE: 96.5 F | RESPIRATION RATE: 16 BRPM | SYSTOLIC BLOOD PRESSURE: 128 MMHG

## 2022-09-11 DIAGNOSIS — H93.11 TINNITUS, RIGHT: Primary | ICD-10-CM

## 2022-09-11 DIAGNOSIS — H61.21 IMPACTED CERUMEN OF RIGHT EAR: ICD-10-CM

## 2022-09-11 DIAGNOSIS — H90.11 CONDUCTIVE HEARING LOSS OF RIGHT EAR, UNSPECIFIED HEARING STATUS ON CONTRALATERAL SIDE: ICD-10-CM

## 2022-09-11 PROCEDURE — 99213 OFFICE O/P EST LOW 20 MIN: CPT | Performed by: NURSE PRACTITIONER

## 2022-09-11 ASSESSMENT — PAIN SCALES - GENERAL: PAINLEVEL: NO PAIN (0)

## 2022-09-11 NOTE — PROGRESS NOTES
"ASSESSMENT/PLAN:    I have reviewed the nursing notes.  I have reviewed the findings, diagnosis, plan and need for follow up with the patient.    1. Tinnitus, right  2. Conductive hearing loss of right ear   Cerumen impaction; likely due to wearing ear plugs daily for job for past 3 months at new job is likely the cause of hearing loss (that resolved after ear flush) and tinnitus. If tinnitus does not resolve completely, would recommend returning for potential audiology / ENT referral. He is agreeable to this. He has been exposed to loud noises in his profession over the years.     2. Impacted cerumen of right ear  Flushed per nursing.     Follow up if symptoms persist or worsen or concerns    I explained my diagnostic considerations and recommendations to the patient, who voiced understanding and agreement with the treatment plan. All questions were answered. We discussed potential side effects of any prescribed or recommended therapies, as well as expectations for response to treatments.    Astrid Solano NP  9/11/2022  10:14 AM    HPI:  Alexis Ibarra is a 42 year old male who presents to [facility] today for concerns of ringing in the right ear for about 5 days now. Wears ear plugs at work ever since starting employment at [employer] 3 months ago. Wears plugs to protect from loud noises. He has been exposed to loud noises at employment over the years; used to work the pipeline as well. He can hear himself chew and talk. No pain associated with the hearing loss or tinnitus he is experiencing. No history of ringing in the ears before - at least not for this long. No other symptoms are present. Hearing is decreased on the right side to hearing others speak.     He states that his ears \"drain all the time\" just clear fluid. Possible allergy symptoms. Has not tried antihistamines.   Cleans ears with a q tip every few days just on the outsides. Does not use on the inside of ears.     He has been told he has some " hearing loss from the person who tested his ears at his employer had commented on some hearing loss. However, this person was not an audiologist or an ENT.     No new medications or drug changes. No diabetes, htn, or any autoimmune diseases.     Has had some trouble focusing over the years related to his ADHD.     Past Medical History:   Diagnosis Date     Uncomplicated asthma     No Comments Provided     Past Surgical History:   Procedure Laterality Date     ARTHROSCOPY SHOULDER      9/02,Left shoulder arthroscopy and Bank heart repair     Social History     Tobacco Use     Smoking status: Never Smoker     Smokeless tobacco: Never Used   Substance Use Topics     Alcohol use: No     Current Outpatient Medications   Medication Sig Dispense Refill     albuterol (PROAIR HFA/PROVENTIL HFA/VENTOLIN HFA) 108 (90 Base) MCG/ACT inhaler Inhale 1-2 puffs into the lungs every 4 hours as needed for shortness of breath / dyspnea or wheezing 1 Inhaler 5     Allergies   Allergen Reactions     Amoxicillin Hives     Past medical history, past surgical history, current medications and allergies reviewed and accurate to the best of my knowledge.      ROS:  Refer to HPI    /80 (BP Location: Left arm, Patient Position: Sitting, Cuff Size: Adult Regular)   Pulse 66   Temp (!) 96.5  F (35.8  C) (Tympanic)   Resp 16   Wt 92.1 kg (203 lb)   SpO2 98%   BMI 31.79 kg/m      EXAM:  General Appearance: Well appearing 42 year old male, appropriate appearance for age. No acute distress   Ears: Left TM intact, translucent with bony landmarks appreciated, no erythema, no effusion, no bulging, no purulence.  + Normal ear exam after cerumen impaction was resolved. Right TM intact, translucent with bony landmarks appreciated, no erythema, no effusion, no bulging, no purulence.  Left auditory canal clear.  Right auditory canal clear.  Normal external ears, non tender.  Respiratory:  No increased work of breathing.  No cough  appreciated.  Psychological: normal affect, alert, oriented, and pleasant.

## 2022-09-11 NOTE — PATIENT INSTRUCTIONS
Sometimes a referral to ENT or audiology is necessary for ringing in the ears if it continues. It may resolve following cerumen removal .

## 2022-09-11 NOTE — NURSING NOTE
The right canal was irrigated withbody-temperature tap water with the jet of water directed superiorly.  The ear canal was then re-examined and cleared of the impaction.  The patient tolerated the procedure well.    Estefany Fleming CMA ([facility]).....9/11/202210:27 AM

## 2022-09-17 ENCOUNTER — HEALTH MAINTENANCE LETTER (OUTPATIENT)
Age: 42
End: 2022-09-17

## 2023-05-06 ENCOUNTER — HEALTH MAINTENANCE LETTER (OUTPATIENT)
Age: 43
End: 2023-05-06

## 2023-10-14 ENCOUNTER — APPOINTMENT (OUTPATIENT)
Dept: LAB | Facility: OTHER | Age: 43
End: 2023-10-14
Attending: FAMILY MEDICINE

## 2024-07-13 ENCOUNTER — HEALTH MAINTENANCE LETTER (OUTPATIENT)
Age: 44
End: 2024-07-13